# Patient Record
Sex: FEMALE | Race: WHITE | NOT HISPANIC OR LATINO
[De-identification: names, ages, dates, MRNs, and addresses within clinical notes are randomized per-mention and may not be internally consistent; named-entity substitution may affect disease eponyms.]

---

## 2017-08-15 ENCOUNTER — APPOINTMENT (OUTPATIENT)
Dept: COLORECTAL SURGERY | Facility: CLINIC | Age: 77
End: 2017-08-15
Payer: MEDICARE

## 2017-08-15 VITALS
HEART RATE: 67 BPM | WEIGHT: 170 LBS | BODY MASS INDEX: 31.28 KG/M2 | DIASTOLIC BLOOD PRESSURE: 82 MMHG | OXYGEN SATURATION: 95 % | SYSTOLIC BLOOD PRESSURE: 131 MMHG | RESPIRATION RATE: 14 BRPM | HEIGHT: 62 IN

## 2017-08-15 DIAGNOSIS — Z82.49 FAMILY HISTORY OF ISCHEMIC HEART DISEASE AND OTHER DISEASES OF THE CIRCULATORY SYSTEM: ICD-10-CM

## 2017-08-15 DIAGNOSIS — R15.9 FULL INCONTINENCE OF FECES: ICD-10-CM

## 2017-08-15 DIAGNOSIS — Z83.79 FAMILY HISTORY OF OTHER DISEASES OF THE DIGESTIVE SYSTEM: ICD-10-CM

## 2017-08-15 DIAGNOSIS — K58.9 IRRITABLE BOWEL SYNDROME W/OUT DIARRHEA: ICD-10-CM

## 2017-08-15 DIAGNOSIS — Z83.1 FAMILY HISTORY OF OTHER INFECTIOUS AND PARASITIC DISEASES: ICD-10-CM

## 2017-08-15 PROCEDURE — 99204 OFFICE O/P NEW MOD 45 MIN: CPT

## 2017-08-15 RX ORDER — ACETAMINOPHEN AND CODEINE PHOSPHATE 300; 30 MG/1; MG/1
TABLET ORAL
Refills: 0 | Status: ACTIVE | COMMUNITY

## 2017-08-24 ENCOUNTER — APPOINTMENT (OUTPATIENT)
Dept: COLORECTAL SURGERY | Facility: CLINIC | Age: 77
End: 2017-08-24

## 2021-09-29 ENCOUNTER — NON-APPOINTMENT (OUTPATIENT)
Age: 81
End: 2021-09-29

## 2021-09-29 ENCOUNTER — APPOINTMENT (OUTPATIENT)
Dept: ORTHOPEDIC SURGERY | Facility: CLINIC | Age: 81
End: 2021-09-29
Payer: MEDICARE

## 2021-09-29 VITALS
WEIGHT: 200 LBS | HEART RATE: 106 BPM | DIASTOLIC BLOOD PRESSURE: 82 MMHG | HEIGHT: 61 IN | BODY MASS INDEX: 37.76 KG/M2 | SYSTOLIC BLOOD PRESSURE: 135 MMHG

## 2021-09-29 DIAGNOSIS — M43.17 SPONDYLOLISTHESIS, LUMBOSACRAL REGION: ICD-10-CM

## 2021-09-29 PROCEDURE — 72110 X-RAY EXAM L-2 SPINE 4/>VWS: CPT

## 2021-09-29 PROCEDURE — 99204 OFFICE O/P NEW MOD 45 MIN: CPT

## 2021-09-29 RX ORDER — LORAZEPAM 2 MG/1
TABLET ORAL
Refills: 0 | Status: DISCONTINUED | COMMUNITY
End: 2021-09-29

## 2021-09-29 RX ORDER — METHYLPREDNISOLONE 4 MG/1
4 TABLET ORAL
Qty: 1 | Refills: 0 | Status: ACTIVE | COMMUNITY
Start: 2021-09-29 | End: 1900-01-01

## 2021-09-29 RX ORDER — ZOLPIDEM TARTRATE 5 MG/1
TABLET, FILM COATED ORAL
Refills: 0 | Status: DISCONTINUED | COMMUNITY
End: 2021-09-29

## 2021-09-29 RX ORDER — OMEPRAZOLE MAGNESIUM 40 MG/1
40 CAPSULE, DELAYED RELEASE ORAL
Refills: 0 | Status: DISCONTINUED | COMMUNITY
End: 2021-09-29

## 2021-09-29 RX ORDER — LOPERAMIDE HCL 2 MG
CAPSULE ORAL
Refills: 0 | Status: DISCONTINUED | COMMUNITY
End: 2021-09-29

## 2021-10-01 ENCOUNTER — APPOINTMENT (OUTPATIENT)
Dept: MRI IMAGING | Facility: CLINIC | Age: 81
End: 2021-10-01
Payer: MEDICARE

## 2021-10-01 ENCOUNTER — OUTPATIENT (OUTPATIENT)
Dept: OUTPATIENT SERVICES | Facility: HOSPITAL | Age: 81
LOS: 1 days | End: 2021-10-01
Payer: MEDICARE

## 2021-10-01 ENCOUNTER — RESULT REVIEW (OUTPATIENT)
Age: 81
End: 2021-10-01

## 2021-10-01 DIAGNOSIS — M48.061 SPINAL STENOSIS, LUMBAR REGION WITHOUT NEUROGENIC CLAUDICATION: ICD-10-CM

## 2021-10-01 DIAGNOSIS — M43.17 SPONDYLOLISTHESIS, LUMBOSACRAL REGION: ICD-10-CM

## 2021-10-01 DIAGNOSIS — M51.36 OTHER INTERVERTEBRAL DISC DEGENERATION, LUMBAR REGION: ICD-10-CM

## 2021-10-01 PROCEDURE — 72148 MRI LUMBAR SPINE W/O DYE: CPT | Mod: 26,MH

## 2021-10-01 PROCEDURE — 72148 MRI LUMBAR SPINE W/O DYE: CPT | Mod: MH

## 2021-10-06 ENCOUNTER — NON-APPOINTMENT (OUTPATIENT)
Age: 81
End: 2021-10-06

## 2021-10-06 ENCOUNTER — TRANSCRIPTION ENCOUNTER (OUTPATIENT)
Age: 81
End: 2021-10-06

## 2021-10-06 ENCOUNTER — APPOINTMENT (OUTPATIENT)
Dept: ORTHOPEDIC SURGERY | Facility: CLINIC | Age: 81
End: 2021-10-06
Payer: MEDICARE

## 2021-10-06 VITALS
SYSTOLIC BLOOD PRESSURE: 147 MMHG | BODY MASS INDEX: 37.76 KG/M2 | WEIGHT: 200 LBS | DIASTOLIC BLOOD PRESSURE: 88 MMHG | HEART RATE: 76 BPM | HEIGHT: 61 IN

## 2021-10-06 DIAGNOSIS — M48.061 SPINAL STENOSIS, LUMBAR REGION WITHOUT NEUROGENIC CLAUDICATION: ICD-10-CM

## 2021-10-06 PROCEDURE — 99214 OFFICE O/P EST MOD 30 MIN: CPT

## 2021-10-06 NOTE — PHYSICAL EXAM
[Antalgic] : antalgic [Wide-Based] : wide-based [Walker] : ambulates with walker [Normal RLE] : Right Lower Extremity: No scars, rashes, lesions, ulcers, skin intact [Normal LLE] : Left Lower Extremity: No scars, rashes, lesions, ulcers, skin intact [Poor Appearance] : well-appearing [Acute Distress] : not in acute distress [Obese] : not obese [Normal] : No costovertebral angle tenderness and no spinal tenderness [de-identified] : right leg with weakness of the right heel walk\par toe walk is intact\par negative tension sign [de-identified] : Diminished reflex of the right ankle [de-identified] : MRI evaluation of the lumbar spine obtained on October 1, 2021 reveals multilevel degenerative disease and significant spinal stenosis at L4-5.  Personal review also indicates a fairly new right-sided herniated disc at L1-2 which is consistent with the patient's symptoms.

## 2021-10-06 NOTE — HISTORY OF PRESENT ILLNESS
[de-identified] : The patient returns for follow-up to review the results of recent MRI of the lower back.  Patient's low back pain is 3 out of 10 and worsens with weather.  She has pain that travels from the back into the right lower extremity.  She has been using Percocet with some relief. [3] : a current pain level of 3/10

## 2021-10-06 NOTE — DISCUSSION/SUMMARY
[de-identified] : The patient returns for follow-up to review the results of the MRI.  It appears that she has a newfound disc herniation on the right.  She is going to discuss with her pain management specialist whether or not an epidural at that level would be more efficacious than at the L4-5 level.  With respect to rehabilitation when she also has questions for of recommended that she obtain a physiatric evaluation.  The patient is going to follow-up with that person.  Patient is currently not a surgical candidate at this time given her medical comorbidities and age.  Patient will follow up with us as needed.

## 2021-10-06 NOTE — REASON FOR VISIT
[Follow-Up Visit] : a follow-up visit for [Degenerative Joint Disease] : degenerative joint disease [Other: _____] : [unfilled] [FreeTextEntry2] : Acquired spondylolisthesis of lumbosacral region MRI of lumbar spine review

## 2022-03-24 ENCOUNTER — APPOINTMENT (OUTPATIENT)
Dept: COLORECTAL SURGERY | Facility: CLINIC | Age: 82
End: 2022-03-24

## 2023-04-24 ENCOUNTER — APPOINTMENT (OUTPATIENT)
Dept: OTOLARYNGOLOGY | Facility: CLINIC | Age: 83
End: 2023-04-24

## 2024-08-08 ENCOUNTER — APPOINTMENT (OUTPATIENT)
Dept: OTOLARYNGOLOGY | Facility: CLINIC | Age: 84
End: 2024-08-08